# Patient Record
(demographics unavailable — no encounter records)

---

## 2017-08-30 NOTE — CT
EXAMINATION TYPE: CT abdomen pelvis w con

 

DATE OF EXAM: 8/30/2017

 

COMPARISON: NONE

 

HISTORY: Pain not further specified per order. Low hemoglobin or abnormal labs.

 

CT DLP: 732.30 mGycm, Automated Exposure Control for Dose Reduction was Utilized.

 

CONTRAST: 

CT scan of the abdomen and pelvis is performed without oral but with IV Contrast, patient injected wi
th 100 ml mL of Omnipaque 300.

 

FINDINGS:

 

LUNG BASES: Dependent atelectasis is seen in both lung bases.

 

LIVER/GB:   No significant abnormality is appreciated.

 

PANCREAS:  No significant abnormality is seen.

 

SPLEEN:  No significant abnormality is seen.

 

ADRENALS:  No significant abnormality is seen.

 

KIDNEYS:  No significant abnormality is seen.

 

BOWEL:   No suspicious small or large bowel dilatation is present. Fecal material is seen in terminal
 ileum. This is consistent with delayed passage of ingested material to colonic level. 

 

The left colon is slightly more medial in location than typical. Occasional colonic diverticula are s
een. 

 

Evaluation of bowel is suboptimal secondary to lack of enteric contrast. Near the level of the distal
 transverse colon just inferior to the greater curvature of the stomach seen best on axial image 30 a
nd coronal image 29 there is irregular thick-walled fluid collection with nondependent air and air-fl
uid level felt to reflect abscess measuring approximately 7.1 cm AP diameter by 3.5 cm transversely o
n axial image 30 x 5.0 cm craniocaudal dimension on coronal image 29. Some indistinct margins from ad
jacent stomach and small bowel loops is present, fistulous communication cannot be excluded. No free 
air is evident.

 

PROSTATE/SEMINAL VESICLES: Some central zone calcifications are seen in normal size prostate gland.

 

LYMPH NODES:  No greater than 1cm abdominal or pelvic lymph nodes are appreciated.

 

OSSEOUS STRUCTURES: Facet arthropathy lower lumbar levels is seen.

 

OTHER: There is mild calcified atherosclerotic change of the abdominal aorta extending into pelvic br
anch vessels.

 

IMPRESSION: There is suspicious fairly moderate sized irregular thick-walled fluid collection in the 
upper abdomen just left of midline suspicious for intra-abdominal abscess.

## 2017-08-30 NOTE — ED
General Adult HPI





- General


Chief complaint: Recheck/Abnormal Lab/Rx


Stated complaint: hemoglobin low/sent by 


Time Seen by Provider: 08/30/17 11:06


Source: patient, RN notes reviewed, old records reviewed


Mode of arrival: ambulatory


Limitations: no limitations





- History of Present Illness


Initial comments: 





This is a 55-year-old male here for evaluation.  Patient coming from patient 

abnormal lab tests.  Patient coming in for low hemoglobin.  Patient denies 

blood in his stools denies black stools.  Denies weakness dizziness or 

lightheadedness.  No blood thinners.  Patient does admit to some left sided 

abdominal pain.  Patient did have recent colonoscopy which was negative, 

patient was sent in by .





- Related Data


 Home Medications











 Medication  Instructions  Recorded  Confirmed


 


Dicyclomine [Bentyl] 10 mg PO Q6H 08/30/17 08/30/17











 Allergies











Allergy/AdvReac Type Severity Reaction Status Date / Time


 


No Known Allergies Allergy   Verified 08/30/17 11:47














Review of Systems


ROS Statement: 


Those systems with pertinent positive or pertinent negative responses have been 

documented in the HPI.





ROS Other: All systems not noted in ROS Statement are negative.





Past Medical History


Past Medical History: No Reported History


History of Any Multi-Drug Resistant Organisms: None Reported


Past Surgical History: Appendectomy, Orthopedic Surgery


Past Psychological History: No Psychological Hx Reported


Smoking Status: Former smoker


Past Alcohol Use History: None Reported


Past Drug Use History: None Reported





General Exam


Limitations: no limitations





Course


 Vital Signs











  08/30/17





  11:00


 


Temperature 98.7 F


 


Pulse Rate 93


 


Respiratory 18





Rate 


 


Blood Pressure 131/77


 


O2 Sat by Pulse 100





Oximetry 














- Reevaluation(s)


Reevaluation #1: 





08/30/17 13:06


Spoke with , we'll obtain CT abdomen and pelvis, follow-up with GI





Medical Decision Making





- Medical Decision Making





55-year-old here for evaluation of anemia, patient will continue follow-up for 

outpatient evaluation.  Patient's in no acute distress on lab work is normal 

and stable





- Lab Data


Result diagrams: 


 08/30/17 11:25





 08/30/17 11:25


 Lab Results











  08/30/17 08/30/17 08/30/17 Range/Units





  11:25 11:25 11:25 


 


WBC  7.1    (3.8-10.6)  k/uL


 


RBC  3.29 L    (4.30-5.90)  m/uL


 


Hgb  7.5 L    (13.0-17.5)  gm/dL


 


Hct  24.8 L    (39.0-53.0)  %


 


MCV  75.3 L    (80.0-100.0)  fL


 


MCH  22.9 L    (25.0-35.0)  pg


 


MCHC  30.4 L    (31.0-37.0)  g/dL


 


RDW  14.6    (11.5-15.5)  %


 


Plt Count  515 H    (150-450)  k/uL


 


Neutrophils %  79    %


 


Lymphocytes %  11    %


 


Monocytes %  5    %


 


Eosinophils %  3    %


 


Basophils %  1    %


 


Neutrophils #  5.6    (1.3-7.7)  k/uL


 


Lymphocytes #  0.8 L    (1.0-4.8)  k/uL


 


Monocytes #  0.3    (0-1.0)  k/uL


 


Eosinophils #  0.2    (0-0.7)  k/uL


 


Basophils #  0.0    (0-0.2)  k/uL


 


Hypochromasia  Marked    


 


Poikilocytosis  Moderate    


 


Microcytosis  Slight    


 


PT    10.5  (9.0-12.0)  sec


 


INR    1.0  (<1.2)  


 


APTT    21.8 L  (22.0-30.0)  sec


 


Sodium   138   (137-145)  mmol/L


 


Potassium   4.6   (3.5-5.1)  mmol/L


 


Chloride   106   ()  mmol/L


 


Carbon Dioxide   23   (22-30)  mmol/L


 


Anion Gap   9   mmol/L


 


BUN   22 H   (9-20)  mg/dL


 


Creatinine   0.92   (0.66-1.25)  mg/dL


 


Est GFR (MDRD) Af Amer   >60   (>60 ml/min/1.73 sqM)  


 


Est GFR (MDRD) Non-Af   >60   (>60 ml/min/1.73 sqM)  


 


Glucose   97   (74-99)  mg/dL


 


Calcium   8.6   (8.4-10.2)  mg/dL


 


Magnesium   1.9   (1.6-2.3)  mg/dL


 


Total Bilirubin   0.3   (0.2-1.3)  mg/dL


 


AST   14 L   (17-59)  U/L


 


ALT   30   (21-72)  U/L


 


Alkaline Phosphatase   64   ()  U/L


 


Total Protein   6.4   (6.3-8.2)  g/dL


 


Albumin   3.7   (3.5-5.0)  g/dL


 


Blood Type     


 


Blood Type Confirm     


 


Blood Type Recheck     


 


Antibody Screen     


 


Spec Expiration Date     














  08/30/17 08/30/17 Range/Units





  11:25 12:54 


 


WBC    (3.8-10.6)  k/uL


 


RBC    (4.30-5.90)  m/uL


 


Hgb    (13.0-17.5)  gm/dL


 


Hct    (39.0-53.0)  %


 


MCV    (80.0-100.0)  fL


 


MCH    (25.0-35.0)  pg


 


MCHC    (31.0-37.0)  g/dL


 


RDW    (11.5-15.5)  %


 


Plt Count    (150-450)  k/uL


 


Neutrophils %    %


 


Lymphocytes %    %


 


Monocytes %    %


 


Eosinophils %    %


 


Basophils %    %


 


Neutrophils #    (1.3-7.7)  k/uL


 


Lymphocytes #    (1.0-4.8)  k/uL


 


Monocytes #    (0-1.0)  k/uL


 


Eosinophils #    (0-0.7)  k/uL


 


Basophils #    (0-0.2)  k/uL


 


Hypochromasia    


 


Poikilocytosis    


 


Microcytosis    


 


PT    (9.0-12.0)  sec


 


INR    (<1.2)  


 


APTT    (22.0-30.0)  sec


 


Sodium    (137-145)  mmol/L


 


Potassium    (3.5-5.1)  mmol/L


 


Chloride    ()  mmol/L


 


Carbon Dioxide    (22-30)  mmol/L


 


Anion Gap    mmol/L


 


BUN    (9-20)  mg/dL


 


Creatinine    (0.66-1.25)  mg/dL


 


Est GFR (MDRD) Af Amer    (>60 ml/min/1.73 sqM)  


 


Est GFR (MDRD) Non-Af    (>60 ml/min/1.73 sqM)  


 


Glucose    (74-99)  mg/dL


 


Calcium    (8.4-10.2)  mg/dL


 


Magnesium    (1.6-2.3)  mg/dL


 


Total Bilirubin    (0.2-1.3)  mg/dL


 


AST    (17-59)  U/L


 


ALT    (21-72)  U/L


 


Alkaline Phosphatase    ()  U/L


 


Total Protein    (6.3-8.2)  g/dL


 


Albumin    (3.5-5.0)  g/dL


 


Blood Type  A Positive   


 


Blood Type Confirm   A Positive  


 


Blood Type Recheck  CABO Indicated   


 


Antibody Screen  NEGATIVE   


 


Spec Expiration Date  09/02/2017 - 8511   














- Radiology Data


Radiology results: report reviewed (CT abdomen and pelvis negative for acute 

disease), image reviewed





Disposition


Clinical Impression: 


 Anemia





Disposition: HOME SELF-CARE


Condition: Good


Instructions:  Anemia (ED)


Referrals: 


Ibrahima Wilkerson DO [Primary Care Provider] - 1-2 days


Buddy Murcia MD [STAFF PHYSICIAN] - 1-2 days


Sreekanth Jerome MD [STAFF PHYSICIAN] - 1-2 days

## 2017-09-01 NOTE — ED
General Adult HPI





- General


Chief complaint: Nausea/Vomiting/Diarrhea


Stated complaint: DIZZINESS


Time Seen by Provider: 09/01/17 09:31


Source: patient, RN notes reviewed


Mode of arrival: ambulatory


Limitations: no limitations





- History of Present Illness


Initial comments: 





Patient 55-year-old male significant past medical history for anemia, who 

presents emergency room today with chief complaint feeling lightheaded.  He 

does admit that he was here 2 days ago and told that he had low iron.  He 

states that he began iron supplement.  He states that he did take it this 

morning and then worked out.  He states when he can't work is feeling somewhat 

dizzy lightheaded.  He also admits that he felt a little nauseous.  States 

still feeling this way currently.  He denies any other complaints at this time.

  Patient denies any recent fever, chills, shortness of breath, chest pain, 

back pain, numbness or tingling, dysuria or hematuria, constipation or diarrhea

, headaches or visual changes, or any other complaints.





- Related Data


 Home Medications











 Medication  Instructions  Recorded  Confirmed


 


Dicyclomine [Bentyl] 10 mg PO Q6H 08/30/17 09/01/17











 Allergies











Allergy/AdvReac Type Severity Reaction Status Date / Time


 


No Known Allergies Allergy   Verified 09/01/17 09:30














Review of Systems


ROS Statement: 


Those systems with pertinent positive or pertinent negative responses have been 

documented in the HPI.





ROS Other: All systems not noted in ROS Statement are negative.





Past Medical History


Past Medical History: No Reported History


History of Any Multi-Drug Resistant Organisms: None Reported


Past Surgical History: Appendectomy, Orthopedic Surgery


Past Psychological History: No Psychological Hx Reported


Smoking Status: Former smoker


Past Alcohol Use History: None Reported


Past Drug Use History: None Reported





General Exam





- General Exam Comments


Initial Comments: 





General:  The patient is awake and alert, in no distress, and does not appear 

acutely ill. 


Eye:  Pupils are equal, round and reactive to light, extra-ocular movements are 

intact.  No nystagmus.  There is normal conjunctiva bilaterally.  No signs of 

icterus.  


Ears, nose, mouth and throat:  There are moist mucous membranes and no oral 

lesions. 


Neck:  The neck is supple, there is no tenderness or JVD.  


Cardiovascular:  There is a regular rate and rhythm. No murmur, rub or gallop 

is appreciated.


Respiratory:  Lungs are clear to auscultation, respirations are non-labored, 

breath sounds are equal.  No wheezes, stridor, rales, or rhonchi.


Gastrointestinal:  Normal appearance abdomen.  Normal bowel sounds.  Abdomen 

soft on palpation.  Patient does have epigastric pain and left upper quadrant 

on exam.  No rebound tenderness.  No guarding.  No CVA tenderness.


Musculoskeletal:  Normal ROM, no tenderness.  Strength 5/5. Sensation intact. 

Pulses equal bilaterally 2+.  


Neurological:  A&O x 3. CN II-XII intact, There are no obvious motor or sensory 

deficits. Coordination appears grossly intact. Speech is normal.


Skin:  Skin is warm and dry and no rashes or lesions are noted. 


Psychiatric:  Cooperative, appropriate mood & affect, normal judgment.  


Limitations: no limitations





Course


 Vital Signs











  09/01/17





  09:25


 


Temperature 97.0 F L


 


Pulse Rate 74


 


Respiratory 18





Rate 


 


Blood Pressure 133/74


 


O2 Sat by Pulse 100





Oximetry 














Medical Decision Making





- Medical Decision Making





Case discussed in detail with attending physician  Patient reexamined at 

this time shows no signs of distress resting comfortably.  Patient labs 

reviewed shows Hemoccult 7.1.  Was 7.5 08/30/2017.  Patient remaining labs been 

reviewed.  Patient's CAT scan from 08/30/2017 also reviewed shows possible 

abscess formation in the upper abdomen just left of midline.  Patient does have 

an appointment to see Dr. Murcia.  Patient is currently symptomatic feeling 

lightheaded and dizzy will be transfused 2 units admitted to the hospital with 

consult to surgery.





- Lab Data


Result diagrams: 


 09/01/17 09:49





 09/01/17 09:49


 Lab Results











  09/01/17 09/01/17 09/01/17 Range/Units





  09:49 09:49 09:49 


 


WBC   5.6   (3.8-10.6)  k/uL


 


RBC   3.10 L   (4.30-5.90)  m/uL


 


Hgb   7.1 L   (13.0-17.5)  gm/dL


 


Hct   23.3 L   (39.0-53.0)  %


 


MCV   75.2 L   (80.0-100.0)  fL


 


MCH   22.8 L   (25.0-35.0)  pg


 


MCHC   30.3 L   (31.0-37.0)  g/dL


 


RDW   15.4   (11.5-15.5)  %


 


Plt Count   481 H   (150-450)  k/uL


 


Neutrophils %   72   %


 


Lymphocytes %   14   %


 


Monocytes %   6   %


 


Eosinophils %   5   %


 


Basophils %   1   %


 


Neutrophils #   4.1   (1.3-7.7)  k/uL


 


Lymphocytes #   0.8 L   (1.0-4.8)  k/uL


 


Monocytes #   0.4   (0-1.0)  k/uL


 


Eosinophils #   0.3   (0-0.7)  k/uL


 


Basophils #   0.0   (0-0.2)  k/uL


 


Hypochromasia   Marked   


 


Poikilocytosis   Moderate   


 


Microcytosis   Slight   


 


PT    10.5  (9.0-12.0)  sec


 


INR    1.0  (<1.2)  


 


APTT    22.0  (22.0-30.0)  sec


 


Sodium  139    (137-145)  mmol/L


 


Potassium  5.0    (3.5-5.1)  mmol/L


 


Chloride  106    ()  mmol/L


 


Carbon Dioxide  23    (22-30)  mmol/L


 


Anion Gap  10    mmol/L


 


BUN  18    (9-20)  mg/dL


 


Creatinine  0.94    (0.66-1.25)  mg/dL


 


Est GFR (MDRD) Af Amer  >60    (>60 ml/min/1.73 sqM)  


 


Est GFR (MDRD) Non-Af  >60    (>60 ml/min/1.73 sqM)  


 


Glucose  91    (74-99)  mg/dL


 


Calcium  8.9    (8.4-10.2)  mg/dL


 


Total Bilirubin  0.2    (0.2-1.3)  mg/dL


 


AST  17    (17-59)  U/L


 


ALT  31    (21-72)  U/L


 


Alkaline Phosphatase  59    ()  U/L


 


Total Protein  6.2 L    (6.3-8.2)  g/dL


 


Albumin  3.7    (3.5-5.0)  g/dL


 


Amylase  32    ()  U/L


 


Lipase  88    ()  U/L














Disposition


Clinical Impression: 


 Symptomatic anemia, Abdominal abscess





Disposition: ADMITTED AS IP TO THIS \[facility]\""


Condition: Stable


Referrals: 


Ibrahima Wilkerson DO [Primary Care Provider] - 1-2 days


Time of Disposition: 10:42

## 2017-09-01 NOTE — P.GSCN
<Melody Turcios - Last Filed: 17 14:14>





History of Present Illness


Consult date: 17


Reason for Consult: 





Anemia abdominal pain


History of present illness: 





55-year-old male who is being seen at the request of the attending for a 

surgical eval who presented to the emergency room with a chief complaint of 

feeling dizzy lightheaded.  Patient stated over the last several weeks he has 

been having episodes of bilateral lower abdominal pain when he tries to eat 

seems to  cause aggravation with increased pain       Patient states he is a 

very active individual and works out with an exercise program several times a 

week. 


 He states that he was in the emergency room 2 nights ago per recommendations 

of his PCP for abnormal labs  was told that he had low iron was put on iron 

supplement.   Patient states  he was called by his primary doctor to 

come to the emergency room because he was noted to have abnormal labs test and 

that he needed to be evaluated.  Patient stated that he was told he had low 

hemoglobin.  Patient stated he was seen in the emergency room on  

hemoglobin was checked and was noted to be 7.5.  Patient had a CAT scan of the 

abdomen pelvis at that time which done on .  Reviewing the computed 

tomography scan of the abdomen pelvis which was done without oral with IV 

contrast in summary showed specific suspicious fairly moderate sized irregular 

thick wall fluid collection in the upper abdomen just left of the midline 

suspicious for intra-abdominal abscess   Patient states about a month ago he 

had a colonoscopy done by  dr raygoza at another facility and was told it was an 

unremarkable study  hemoglobin on   7.1.  Hemoglobin  

was 7.5 patient denies any episodes of blood in stool denies any unintentional 

weight loss weight gain.  Does state that he has been experiencing bilateral 

lower abdominal pain after eating seems to be getting worse


Patient has no significant past medical history   past surgical history 

appendectomy, orthopedic surgery.  Home meds Bentyl which he said he was given 

a prescription in the emergency room on 


  Patient stated this morning that he did take the iron supplement and then he 

went and worked out  he stated that he could not finish his work out because he 

felt dizzy and lightheaded with a sensation of nausea.





Review of Systems





Essentially unremarkable except as mentioned in the present illness





Past Medical History


Past Medical History: Pneumonia


Additional Past Medical History / Comment(s): Recent diagnosis of anemia, past 

1 month pt states sharp abdominal pains all over after eating, elevated 

cholesterol-no rx yet.


History of Any Multi-Drug Resistant Organisms: None Reported


Past Surgical History: Appendectomy, Orthopedic Surgery


Additional Past Surgical History / Comment(s): Bilateral ACL tear repair, 

bilateral knee arthroscopies, colonoscopy recently that was normal.


Past Anesthesia/Blood Transfusion Reactions: No Reported Reaction


Past Psychological History: No Psychological Hx Reported


Additional Psychological History / Comment(s): Pt resides with his spouse and 

his 15 yr old step son.  He is independent.  He works for the [employer].  He 

works out regularly.


Smoking Status: Former smoker


Past Alcohol Use History: None Reported


Additional Past Alcohol Use History / Comment(s): Pt states he was a light 

social smoker but has not smoked any cigarettes in 6 months.  Pt states he has 

not had an alcoholic beverage in months, prior he drank more but never daily or 

abused.


Past Drug Use History: None Reported





- Past Family History


  ** Father


Family Medical History: Pneumonia


Additional Family Medical History / Comment(s): Father had anemia.  He  at 

the age of 79yrs from complications after a ingrown toenail was removed and 

then became infected and had to have amputation.





  ** Mother


Family Medical History: Dementia


Additional Family Medical History / Comment(s): Mother  of dementia at the 

age of 82 yrs.





Medications and Allergies


 Home Medications











 Medication  Instructions  Recorded  Confirmed  Type


 


Omeprazole 40 mg PO DAILY #90 capsule. 17  Rx











 Allergies











Allergy/AdvReac Type Severity Reaction Status Date / Time


 


No Known Allergies Allergy   Verified 17 09:30














Surgical - Exam


 Vital Signs











Temp Pulse Resp BP Pulse Ox


 


 97.0 F L  74   18   133/74   100 


 


 17 09:25  17 09:25  17 09:25  17 09:25  17 09:25














GENERAL APPEARANCE: Very pleasant 55-year-old male patient is alert, oriented, 

in no acute distress.  Sitting up in bed taking a diet states he's not able to 

eat when he thinks about eating he develops bilateral lower abdominal pain with 

a nausea sensation no active emesis


VITAL SIGNS: Reviewed


HEENT: Head is normocephalic and atraumatic. Pupils are equal and reactive. The 

nares are patent. Oropharynx is clear without lesions.


NECK: Supple without lymphadenopathy. Traches midline.


HEART: S1, S2. Regular rate and rhythm.  No murmur noted denying chest pain


LUNGS: No crackles or wheezes are heard.  Adequate air movement bilaterally on 

room air


ABDOMEN: Soft, nontender, nondistended with good bowel sounds. No peritoneal 

signs. No palpable organomegaly or masses.


EXTREMITIES: Normal skin color and turgor. No cyanosis, rash, ulceration, 

clubbing or edema. Radial pedal pulses are 2/4 bilaterally.


NEUROLOGICAL: No focal deficits. Strength and sensation are grossly intact.








Results





- Labs





 17 09:49





 17 09:49


 Abnormal Lab Results - Last 24 Hours (Table)











  17 Range/Units





  09:49 09:49 09:49 


 


RBC    3.10 L  (4.30-5.90)  m/uL


 


Hgb    7.1 L  (13.0-17.5)  gm/dL


 


Hct    23.3 L  (39.0-53.0)  %


 


MCV    75.2 L  (80.0-100.0)  fL


 


MCH    22.8 L  (25.0-35.0)  pg


 


MCHC    30.3 L  (31.0-37.0)  g/dL


 


Plt Count    481 H  (150-450)  k/uL


 


Lymphocytes #    0.8 L  (1.0-4.8)  k/uL


 


Total Creatine Kinase   43 L   ()  U/L


 


Total Protein  6.2 L    (6.3-8.2)  g/dL


 


Crossmatch     














  17 Range/Units





  09:49 


 


RBC   (4.30-5.90)  m/uL


 


Hgb   (13.0-17.5)  gm/dL


 


Hct   (39.0-53.0)  %


 


MCV   (80.0-100.0)  fL


 


MCH   (25.0-35.0)  pg


 


MCHC   (31.0-37.0)  g/dL


 


Plt Count   (150-450)  k/uL


 


Lymphocytes #   (1.0-4.8)  k/uL


 


Total Creatine Kinase   ()  U/L


 


Total Protein   (6.3-8.2)  g/dL


 


Crossmatch  See Detail  








 Diabetes panel











  17 Range/Units





  09:49 


 


Sodium  139  (137-145)  mmol/L


 


Potassium  5.0  (3.5-5.1)  mmol/L


 


Chloride  106  ()  mmol/L


 


Carbon Dioxide  23  (22-30)  mmol/L


 


BUN  18  (9-20)  mg/dL


 


Creatinine  0.94  (0.66-1.25)  mg/dL


 


Glucose  91  (74-99)  mg/dL


 


Calcium  8.9  (8.4-10.2)  mg/dL


 


AST  17  (17-59)  U/L


 


ALT  31  (21-72)  U/L


 


Alkaline Phosphatase  59  ()  U/L


 


Total Protein  6.2 L  (6.3-8.2)  g/dL


 


Albumin  3.7  (3.5-5.0)  g/dL








 Calcium panel











  17 Range/Units





  09:49 


 


Calcium  8.9  (8.4-10.2)  mg/dL


 


Albumin  3.7  (3.5-5.0)  g/dL








 Pituitary panel











  17 Range/Units





  09:49 


 


Sodium  139  (137-145)  mmol/L


 


Potassium  5.0  (3.5-5.1)  mmol/L


 


Chloride  106  ()  mmol/L


 


Carbon Dioxide  23  (22-30)  mmol/L


 


BUN  18  (9-20)  mg/dL


 


Creatinine  0.94  (0.66-1.25)  mg/dL


 


Glucose  91  (74-99)  mg/dL


 


Calcium  8.9  (8.4-10.2)  mg/dL








 Adrenal panel











  17 Range/Units





  09:49 


 


Sodium  139  (137-145)  mmol/L


 


Potassium  5.0  (3.5-5.1)  mmol/L


 


Chloride  106  ()  mmol/L


 


Carbon Dioxide  23  (22-30)  mmol/L


 


BUN  18  (9-20)  mg/dL


 


Creatinine  0.94  (0.66-1.25)  mg/dL


 


Glucose  91  (74-99)  mg/dL


 


Calcium  8.9  (8.4-10.2)  mg/dL


 


Total Bilirubin  0.2  (0.2-1.3)  mg/dL


 


AST  17  (17-59)  U/L


 


ALT  31  (21-72)  U/L


 


Alkaline Phosphatase  59  ()  U/L


 


Total Protein  6.2 L  (6.3-8.2)  g/dL


 


Albumin  3.7  (3.5-5.0)  g/dL














Assessment and Plan


Plan: 





Impression


Present on admission anemia unclear etiology no evidence of acute blood loss


Present on admission abdominal pain with a CAT scan of the abdomen and pelvis 

showing abscess formation left midline possible abscess


Present on admission mild hyperkalemia


Recent colonoscopy one month prior with no acute findings done by Dr. Raygoza at 

another facility





Plan


Will obtain iron studies


Check a ferritin, B12 and folate


Would recommend holding blood transfusion give iron IV as patient is clinically 

stable and patient is anxious about receiving blood transfusions would prefer 

not having a blood transfusion


Further surgical recommendations pending eval the CAT scan of the abdomen pelvis


DVT and GI prophylaxis


Will attempt to obtain colonoscopy report place to chart


Repeat labs in the am








The above impression and plan of care have been discussed and directed by 

signing physician. Melody Turcios nurse practitioner acting as scribe for signing 

physician.





<Laurie Vega N - Last Filed: 17 16:50>





Surgical - Exam


 Vital Signs











Temp Pulse Resp BP Pulse Ox


 


 97.0 F L  74   18   133/74   100 


 


 17 09:25  17 09:25  17 09:25  17 09:25  17 09:25














Results





- Labs





 17 06:23





 17 06:23





Assessment and Plan


(1) Iron deficiency anemia


Status: Acute   





(2) Gastric fistula


Status: Acute   





(3) Left upper quadrant pain


Status: Acute   





(4) Epigastric abdominal pain


Status: Acute   





(5) Right upper quadrant pain


Status: Acute

## 2017-09-02 NOTE — P.HPIM
History of Present Illness


H&P Date: 17


Chief Complaint: Abdominal pain





History of presenting complaint:


This is a 55-year-old patient of Dr. Wilkerson.  Rather unremarkable past medical 

history.  Patient to 2 months as noted that having abdominal pain about how for 

after he eats.  Some nausea but not any vomiting.  Patient therefore started 

eating less and actually lost about 20 pounds.  The patient had a coloscopy 

with Dr. Murcia recently that was unremarkable.  Patient therefore decided to 

come in.





GEN.:  Weight loss 20 pounds


EYES: None


HEENT: None


NECK: None


RESPIRATORY: None


CARDIOVASCULAR: None


GASTROINTESTINAL: As above


GENITOURINARY: None


MUSCULOSKELETAL: None


LYMPHATICS: None


HEMATOLOGICAL: None  


PSYCHIATRY: None


NEUROLOGICAL: None





Past medical history:


Anemia recent diagnosis hypercholesteremia doesn't take any medications





Past surgical history:


Appendectomy, bilateral a CT tear repair, bilateral knee arthroscopy





Home medications:


Bentyl 10 mg every 6 when necessary





ALLERGIES: None





Social history:


 smokes off-and-on stopped about 2 months ago.  No alcohol.  Is a letter 

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





Family history:


Reviewed, noncontributory presentation





VITAL SIGNS: 97.4, 71, 16, 105/68, 97% room air


GENERAL: Average built, sitting up, comfortable.


EYES: Pupils equal.  Conjunctiva normal.


HEENT: External appearance of nose and ears normal, oral cavity grossly normal.


NECK: JVD not raised; masses not palpable.


HEART: First and second heart sounds are normal;  no edema.  


LUNGS: Respiratory rate normal; clear to auscultation.  


ABDOMEN: Soft, minimal upper abdominal tenderness, liver spleen not palpable, 

no masses palpable.  


LYMPHATICS: No lymph nodes palpable in the axilla and neck.  


PSYCH: Alert and oriented x3;  mood  and affect normal.  


NEUROLOGICAL: Cranial nerves grossly intact; no facial asymmetry,   power and 

sensation grossly intact.  





Investigations:


White count 5.6, hemoglobin 7.1, MCV 75.2, platelets 41


Computed tomography scan of the abdomen from 2017 shows fairly moderate 

sized irregular thick-walled collection in the upper abdomen suggesting abscess





Plan:


I discussed the computed tomography scan with Dr. Baker general surgery.  

She is more concerned about a tumor then abscesses.  Patient is minimal 

tenderness if any, no fever and no white count.  She wishes to proceed with the 

EGD tomorrow.  I did suggest maybe a barium study might be beneficial.  This is 

discussed with the patient.  We'll follow Dr. Baker





Past Medical History


Past Medical History: Pneumonia


Additional Past Medical History / Comment(s): Recent diagnosis of anemia, past 

1 month pt states sharp abdominal pains all over after eating, elevated 

cholesterol-no rx yet.


History of Any Multi-Drug Resistant Organisms: None Reported


Past Surgical History: Appendectomy, Orthopedic Surgery


Additional Past Surgical History / Comment(s): Bilateral ACL tear repair, 

bilateral knee arthroscopies, colonoscopy recently that was normal.


Past Anesthesia/Blood Transfusion Reactions: No Reported Reaction


Past Psychological History: No Psychological Hx Reported


Additional Psychological History / Comment(s): Pt resides with his spouse and 

his 15 yr old step son.  He is independent.  He works for the post [employer].  He 

works out regularly.


Smoking Status: Former smoker


Past Alcohol Use History: None Reported


Additional Past Alcohol Use History / Comment(s): Pt states he was a light 

social smoker but has not smoked any cigarettes in 6 months.  Pt states he has 

not had an alcoholic beverage in months, prior he drank more but never daily or 

abused.


Past Drug Use History: None Reported





- Past Family History


  ** Father


Family Medical History: Pneumonia


Additional Family Medical History / Comment(s): Father had anemia.  He  at 

the age of 79yrs from complications after a ingrown toenail was removed and 

then became infected and had to have amputation.





  ** Mother


Family Medical History: Dementia


Additional Family Medical History / Comment(s): Mother  of dementia at the 

age of 82 yrs.





Medications and Allergies


 Home Medications











 Medication  Instructions  Recorded  Confirmed  Type


 


Dicyclomine [Bentyl] 10 mg PO Q6H 17 History











 Allergies











Allergy/AdvReac Type Severity Reaction Status Date / Time


 


No Known Allergies Allergy   Verified 17 09:30














Results


CBC & Chem 7: 


 17 06:23





 17 06:23

## 2017-09-02 NOTE — FL
EXAMINATION TYPE: FL UGI w esophagus

 

DATE OF EXAM: 9/2/2017

 

COMPARISON: CT abdomen and pelvis from 3 days ago.

 

HISTORY: Bilateral lower abdominal pain and nausea after eating. Possible gastric fistula and/or canc
er.

 

TECHNIQUE:  A double contrast UGI study is performed. A total of 1.08 minutes of fluoroscopic time wa
s utilized during procedure. A total of 38 spot images are acquired during procedure.

 

FINDINGS:   image of the abdomen was not performed due to tech error.

 

The esophagus showed normal motility and emptying into the stomach.  No evidence of hiatal hernia or 
stricture noted. Occasional gastroesophageal reflux is identified.

 

Exam is noted suboptimal as there was technical difficulty with inability to saved pictures initially
. There is irregular wall thickening along the greater curvature of the stomach with fistulous commun
ication to proximal small bowel near level of ligament of Treitz identified, this correlates with CT 
coronal image 27. No extravasation of contrast is present. During real-time visualization there is an
tegrade and retrograde filling across the fistulous communication.

 

The duodenal bulb, sweep, and proximal small bowel loops are otherwise unremarkable.

 

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 There is abnormal irregular wall thickening along gre
ater curvature worrisome for inflammatory change. No definitive neoplastic mass or ulcer is present b
ut direct visualization is advised to further evaluate.

## 2017-09-02 NOTE — P.PN
Subjective


Principal diagnosis: 





Anemia with gastric mass





The patient is a 55-year-old gentleman who reports over 2-3 month history of 

epigastric and bilateral upper abdominal pain.  He presented with anemia with 

hemoglobin of 7.  He had initial fatigue.  He had received 2 units of blood 

transfusion as a result.  He still reports abdominal pain especially with 

eating.  He moderate unintentional weight loss over 20 pounds.  He had a CT of 

the abdomen and pelvis demonstrating a mass of the stomach versus an abscess.  

As a result of these findings, general surgery has been consulted.





Objective





- Vital Signs


Vital signs: 


 Vital Signs











Temp  98.0 F   09/02/17 15:00


 


Pulse  71   09/02/17 15:12


 


Resp  15   09/02/17 15:12


 


BP  105/58   09/02/17 15:00


 


Pulse Ox  98   09/02/17 15:00








 Intake & Output











 09/02/17 09/02/17 09/03/17





 06:59 18:59 06:59


 


Intake Total 1770 600 


 


Balance 1770 600 


 


Weight  81.647 kg 


 


Intake:   


 


  Intake, IV Titration 600  





  Amount   


 


    Sodium Chloride 0.9% 1, 600  





    000 ml @ 100 mls/hr IV .   





    Q10H STA Rx#:[id number]   


 


  Oral 240 600 


 


  Blood Product 930  


 


    Rc As-1  Unit 310  





    [id number]   


 


    Rc As-1  Unit 310  





    [id number]   


 


Other:   


 


  Voiding Method Toilet Toilet 


 


  # Voids 2 2 














- Exam





GENERAL:  Well developed.


HEENT:  No sclera icterus. Extraocular movements grossly intact.  Moist buccal 

mucosa. Head is atraumatic, normocephalic. Hears conversational speech. No 

nasal drainage.


NECK:  Supple without lymphadenopathy. No JV distention.


CHEST:  Non-labored respirations and equal bilateral excursions. 


CARDIOVASCULAR:  Regular rate and rhythm.  Palpable 2+ radial pulses.


ABDOMEN:  Soft with tenderness along the bilateral upper abdomen.  No gross 

peritonitis.


MUSCULOSKELETAL:  No clubbing, cyanosis or edema.


NEUROLOGIC:  No focal or lateralizing signs.  Cranial nerves 2-12 grossly 

intact.


PSYCH:  Appropriate affect.  Alert and oriented to person, place and time.


SKIN: Good skin turgor.  Will perfused.








- Labs


CBC & Chem 7: 


 09/02/17 06:23





 09/02/17 06:23


Labs: 


 Abnormal Lab Results - Last 24 Hours (Table)











  09/01/17 09/01/17 09/02/17 Range/Units





  09:49 09:49 06:23 


 


RBC    3.52 L  (4.30-5.90)  m/uL


 


Hgb    8.6 L D  (13.0-17.5)  gm/dL


 


Hct    27.8 L  (39.0-53.0)  %


 


MCV    78.9 L  (80.0-100.0)  fL


 


MCH    24.3 L  (25.0-35.0)  pg


 


MCHC    30.8 L  (31.0-37.0)  g/dL


 


Lymphocytes #    0.7 L  (1.0-4.8)  k/uL


 


Ferritin   4.2 L   (22.0-322.0)  ng/mL


 


Total Bilirubin     (0.2-1.3)  mg/dL


 


AST     (17-59)  U/L


 


Total Protein     (6.3-8.2)  g/dL


 


Albumin     (3.5-5.0)  g/dL


 


Crossmatch  See Detail    














  09/02/17 Range/Units





  06:23 


 


RBC   (4.30-5.90)  m/uL


 


Hgb   (13.0-17.5)  gm/dL


 


Hct   (39.0-53.0)  %


 


MCV   (80.0-100.0)  fL


 


MCH   (25.0-35.0)  pg


 


MCHC   (31.0-37.0)  g/dL


 


Lymphocytes #   (1.0-4.8)  k/uL


 


Ferritin   (22.0-322.0)  ng/mL


 


Total Bilirubin  2.5 H  (0.2-1.3)  mg/dL


 


AST  14 L  (17-59)  U/L


 


Total Protein  5.7 L  (6.3-8.2)  g/dL


 


Albumin  3.3 L  (3.5-5.0)  g/dL


 


Crossmatch   














- Imaging and Cardiology


CT scan - abdomen: report reviewed, image reviewed (Imaging reviewed in detail 

demonstrating gastric fistula including possible peritoneal abscess.)





Assessment and Plan


(1) Iron deficiency anemia


Status: Acute   





(2) Gastric fistula


Status: Acute   





(3) Left upper quadrant pain


Status: Acute   





(4) Epigastric abdominal pain


Status: Acute   





(5) Right upper quadrant pain


Status: Acute   


Plan: 





1.  Malignancy as a cause of anemia versus intra-abdominal bleed from a gastric 

ulcer cannot be excluded.  Recommend upper endoscopy.


2.  Also recommend upper GI to evaluate extent of gastric fistula.


3.  I did review with him likely surgical intervention pending pathology of 

chronic gastric ulcer versus malignancy.


4.  Recommend continued hospitalization.


5.  Will follow closely.





Diagnostic imaging including findings and care plan were discussed with the 

patient's admitting provider and family.


Time with Patient: Greater than 30

## 2017-09-03 NOTE — P.PN
Progress Note - Text





The findings of the patient's upper endoscopy was reviewed with the patient and 

family.  He verbalized understanding of his plan of care.  





An abnormal connection with gastroenteric fistula of unclear etiology was 

identified.  Plan for change of medications from Bentyl to omeprazole.  I 

discussed with the attending team where the patient may follow-up in the 

office.  Surgical intervention pending results of the biopsies.





Patient clear from surgical standpoint for discharge.

## 2017-09-03 NOTE — P.HPADDEND
H&P Addendum


H&P Addendum Date: 09/03/17





Patient had completed an upper GI demonstrating an abnormal connection between 

the stomach and his intestine.  He presented with anemia and required blood 

transfusions.  He also presents with epigastric abdominal pain.  





Recommend proceeding with an upper endoscopy for evaluation of neoplasm versus 

bleeding ulcer.

## 2017-09-03 NOTE — P.PCN
Date of Procedure: 09/03/17


Preoperative Diagnosis: 





Postoperative Diagnosis: 





Procedure(s) Performed: 





Implants: 





Indications for Procedure: 





Operative Findings: 





Description of Procedure: 





PREOPERATIVE DIAGNOSIS:


Iron deficiency anemia.


History of gastrointestinal bleed.


Gastroenteric fistula.


Epigastric abdominal pain.


History of weight loss.





POSTOPERATIVE DIAGNOSIS:


Iron deficiency anemia.


Epigastric abdominal pain.


History of weight loss.


History of gastrointestinal bleed.


Gastroenteric fistula.


Gastroesophageal reflux disease.


Diaphragmatic hiatal hernia without obstruction.





OPERATION:


Esophagogastroduodenoscopy with biopsies along antrum and gastroenteric fistula.





SURGEON: Laurie Vega MD





ANESTHESIA: MAC.





INDICATIONS:


The patient is a 55-year-old male who presents with a history of reflux 

disease. Benefits and risks of the procedure were described. Informed consent 

was obtained.





DESCRIPTION:


The patient was brought into the endoscopy suite and laid in the left lateral 

decubitus position. An Olympus gastroscope was passed along the posterior 

oropharynx down to the distal esophagus where the squamocolumnar junction was 

encountered at 38 cm from the incisors. The stomach was entered and bile reflux 

was found and suctioned from the stomach.  Along the lesser curvature of the 

stomach, an orifice proximal to the angularis incisura of 3 cm in size was 

found.  The orifice was explored where the connection lead to the small 

intestine as confirmed on the patient's upper GI.  The orifice including 

abnormal channel was easily friable with mechanical manipulation.  The scope 

was pulled back into the stomach.  The mucosa of the rest of the stomach was 

within normal limits.  No gastric polyps were identified.  No arteriovenous 

malformations was identified.  Biopsies with cold forceps were obtained of the 

antrum. The first through third portion of the duodenum was examined and 

unremarkable.  The entire length of the upper scope was advanced throughout the 

proximal intestine as to attempt identifying the gastroenteric from the duodenum

/proximal jejunum.  Retroflexion of the scope confirmed  Hill grade 4 lower 

esophageal valve. The squamocolumnar junction demostrated LA grade B erosive 

esophagitis. The stomach was desufflated. The patient tolerated the procedure 

well.





FINDINGS:


Squamocolumnar junction 38 cm from the incisors.


Diaphragmatic hiatus at 41 cm.


Hiatal hernia 3 cm, fixed, type III.


Hill grade 4 lower esophageal valve.


LA grade B erosive esophagitis.


Wide orifice, 3 cm, gastroenteric fistula proximal to the angularis incisura at 

the lesser curvature of the stomach.


No active duodenitis.





RECOMMENDATIONS:


Will await biopsies as to evaluate for malignancy as cause of gastroenteric 

fistula.


Otherwise, will need surgical intervention with partial gastrectomy for fistula 

secondary to gastric ulcer versus total gastrectomy for gastric cancer.


Further recommendations pending results of pathology report.


Recommend discontinue Bentyl and start of proton pump inhibitor therapy.

## 2017-09-04 NOTE — HP
HISTORY AND PHYSICAL



ADDENDUM:



ASSESSMENT:

1. Abdominal pain, rule out gastric ulcer versus less likely but questionable walled

    up abscess.

2. Microcytic anemia.





MMODL / IJN: [id number] / Job#: [id number]

## 2017-09-04 NOTE — DS
DISCHARGE SUMMARY



DATE OF ADMISSION:

09/01/17.



DATE OF DISCHARGE:

09/03/17



FINAL DIAGNOSIS:

1. Gastroenteric fistula.

2. Microcytic anemia.

3. Reactive thrombocytosis.



CONSULTATION:

Dr. Vega.



HOSPITAL COURSE:

This patient presents with abdominal pain half hour after eating and losing some

weight.  The patient underwent a barium study that showed a gastroenteric fistula, also

underwent EGD with Dr. Vega.  She did some biopsies.  She will have the patient

come back for the biopsy results in the office and take it further from there.  Abscess

is not felt to be likely, also perforation and communications felt to be more likely

the cause.



DISCHARGE MEDICATIONS:

Omeprazole 40 mg a day.



Follow up with Dr. Vega on September 12, 2017. Follow Dr. Wilkerson in 1 week.  Lab,

CBC, BMP.  Patient to stay on a light diet.



PHYSICAL EXAMINATION:

On examination abdomen is soft.  Minimal tenderness.





MMODL / IJN: [id number] / Job#: [id number]

## 2017-10-11 NOTE — CT
EXAMINATION TYPE: CT angio chest

 

DATE OF EXAM: 10/11/2017

 

COMPARISON: NONE

 

HISTORY: posterior chest pain 3 weeks post op abdominal surgery for ulcer

 

CT DLP: 320.7 mGycm. Automated Exposure Control for Dose Reduction was Utilized.

 

 

CONTRAST: 

CTA scan of the thorax is performed with IV Contrast, patient injected with 64cc mL of Omnipaque 350,
 pulmonary embolism protocol.  MIP Images are created on CT scanner and reviewed.

 

FINDINGS:

 

LUNGS: There is triangular shaped consolidation and/or atelectasis in the bilateral lower lobes.   No
 suspicious parenchymal nodule or mass is present bilaterally. There is no pleural effusion or pneumo
thorax seen.  The tracheobronchial tree is patent.

 

MEDIASTINUM: There is suboptimal bolus with heterogeneity and equal contrast seen in right and left h
eart systems there is no convincing CT evidence for acute pulmonary embolism.  There are no greater t
rashid 1 cm hilar or mediastinal lymph nodes.   No cardiomegaly or pericardial effusion is seen. 

 

OTHER: There is dilated fluid-filled distal esophagus from subcarinal level through diaphragmatic hia
tus. There is partial visualization of debris-filled dilated stomach. Sutures are seen anterior and i
nferior to this.

 

IMPRESSION: 

1. Suboptimal bolus without CT evidence for pulmonary embolism.

2. Bibasilar triangular shaped atelectasis and/or consolidation.

3. Dilated fluid-filled distal esophagus with dilated debris filled stomach. Cannot rule out gastric 
outlet obstruction. Correlate clinically. Consider nasogastric tube decompression.

## 2019-06-14 NOTE — XR
EXAMINATION TYPE: XR chest 2V

 

DATE OF EXAM: 9/1/2017

 

COMPARISON: 2/25/2014

 

INDICATION: Dizziness low hemoglobin

 

TECHNIQUE:  Frontal and lateral views of the chest are obtained.

 

FINDINGS:  

The heart size is normal.  

The pulmonary vasculature is normal.

The lungs are clear.

 

IMPRESSION:  

1. No acute pulmonary process.
no